# Patient Record
Sex: MALE | Race: BLACK OR AFRICAN AMERICAN | NOT HISPANIC OR LATINO | Employment: STUDENT | URBAN - METROPOLITAN AREA
[De-identification: names, ages, dates, MRNs, and addresses within clinical notes are randomized per-mention and may not be internally consistent; named-entity substitution may affect disease eponyms.]

---

## 2023-10-09 PROBLEM — R56.9 SEIZURES (MULTI): Status: ACTIVE | Noted: 2023-10-09

## 2023-10-09 PROBLEM — G40.309 GENERALIZED EPILEPSY (MULTI): Status: ACTIVE | Noted: 2023-10-09

## 2023-10-09 RX ORDER — ZONISAMIDE 100 MG/1
2 CAPSULE ORAL 2 TIMES DAILY
COMMUNITY
Start: 2014-07-18 | End: 2024-05-22 | Stop reason: SDUPTHER

## 2023-10-09 RX ORDER — DIVALPROEX SODIUM 250 MG/1
5 TABLET, FILM COATED, EXTENDED RELEASE ORAL ONCE
COMMUNITY
Start: 2014-09-04 | End: 2024-04-16 | Stop reason: ALTCHOICE

## 2024-01-15 ENCOUNTER — APPOINTMENT (OUTPATIENT)
Dept: NEUROLOGY | Facility: HOSPITAL | Age: 29
End: 2024-01-15
Payer: COMMERCIAL

## 2024-02-12 ENCOUNTER — APPOINTMENT (OUTPATIENT)
Dept: NEUROLOGY | Facility: HOSPITAL | Age: 29
End: 2024-02-12
Payer: COMMERCIAL

## 2024-04-16 ENCOUNTER — HOSPITAL ENCOUNTER (EMERGENCY)
Facility: HOSPITAL | Age: 29
Discharge: HOME | End: 2024-04-16
Attending: EMERGENCY MEDICINE
Payer: COMMERCIAL

## 2024-04-16 VITALS
OXYGEN SATURATION: 97 % | HEIGHT: 69 IN | SYSTOLIC BLOOD PRESSURE: 112 MMHG | TEMPERATURE: 97.7 F | WEIGHT: 150 LBS | HEART RATE: 87 BPM | RESPIRATION RATE: 12 BRPM | BODY MASS INDEX: 22.22 KG/M2 | DIASTOLIC BLOOD PRESSURE: 75 MMHG

## 2024-04-16 DIAGNOSIS — R56.9 SEIZURE (MULTI): Primary | ICD-10-CM

## 2024-04-16 LAB
ALBUMIN SERPL BCP-MCNC: 3.7 G/DL (ref 3.4–5)
ALP SERPL-CCNC: 39 U/L (ref 33–120)
ALT SERPL W P-5'-P-CCNC: 8 U/L (ref 10–52)
ANION GAP BLDV CALCULATED.4IONS-SCNC: 9 MMOL/L (ref 10–25)
ANION GAP SERPL CALC-SCNC: 14 MMOL/L (ref 10–20)
AST SERPL W P-5'-P-CCNC: 15 U/L (ref 9–39)
ATRIAL RATE: 59 BPM
BASE EXCESS BLDV CALC-SCNC: -1.6 MMOL/L (ref -2–3)
BASOPHILS # BLD AUTO: 0.03 X10*3/UL (ref 0–0.1)
BASOPHILS NFR BLD AUTO: 0.8 %
BILIRUB SERPL-MCNC: 0.3 MG/DL (ref 0–1.2)
BODY TEMPERATURE: 37 DEGREES CELSIUS
BUN SERPL-MCNC: 8 MG/DL (ref 6–23)
CA-I BLDV-SCNC: 1.26 MMOL/L (ref 1.1–1.33)
CALCIUM SERPL-MCNC: 8.6 MG/DL (ref 8.6–10.6)
CHLORIDE BLDV-SCNC: 108 MMOL/L (ref 98–107)
CHLORIDE SERPL-SCNC: 110 MMOL/L (ref 98–107)
CO2 SERPL-SCNC: 21 MMOL/L (ref 21–32)
CREAT SERPL-MCNC: 0.77 MG/DL (ref 0.5–1.3)
EGFRCR SERPLBLD CKD-EPI 2021: >90 ML/MIN/1.73M*2
EOSINOPHIL # BLD AUTO: 0.12 X10*3/UL (ref 0–0.7)
EOSINOPHIL NFR BLD AUTO: 3.4 %
ERYTHROCYTE [DISTWIDTH] IN BLOOD BY AUTOMATED COUNT: 12.8 % (ref 11.5–14.5)
GLUCOSE BLDV-MCNC: 92 MG/DL (ref 74–99)
GLUCOSE SERPL-MCNC: 87 MG/DL (ref 74–99)
HCO3 BLDV-SCNC: 23.7 MMOL/L (ref 22–26)
HCT VFR BLD AUTO: 39.3 % (ref 41–52)
HCT VFR BLD EST: 41 % (ref 41–52)
HGB BLD-MCNC: 13.2 G/DL (ref 13.5–17.5)
HGB BLDV-MCNC: 13.8 G/DL (ref 13.5–17.5)
IMM GRANULOCYTES # BLD AUTO: 0 X10*3/UL (ref 0–0.7)
IMM GRANULOCYTES NFR BLD AUTO: 0 % (ref 0–0.9)
INHALED O2 CONCENTRATION: 21 %
LACTATE BLDV-SCNC: 3.9 MMOL/L (ref 0.4–2)
LYMPHOCYTES # BLD AUTO: 0.92 X10*3/UL (ref 1.2–4.8)
LYMPHOCYTES NFR BLD AUTO: 26 %
MAGNESIUM SERPL-MCNC: 1.81 MG/DL (ref 1.6–2.4)
MCH RBC QN AUTO: 26.6 PG (ref 26–34)
MCHC RBC AUTO-ENTMCNC: 33.6 G/DL (ref 32–36)
MCV RBC AUTO: 79 FL (ref 80–100)
MONOCYTES # BLD AUTO: 0.18 X10*3/UL (ref 0.1–1)
MONOCYTES NFR BLD AUTO: 5.1 %
NEUTROPHILS # BLD AUTO: 2.29 X10*3/UL (ref 1.2–7.7)
NEUTROPHILS NFR BLD AUTO: 64.7 %
NRBC BLD-RTO: 0 /100 WBCS (ref 0–0)
OXYHGB MFR BLDV: 66.9 % (ref 45–75)
P AXIS: 54 DEGREES
P OFFSET: 193 MS
P ONSET: 136 MS
PCO2 BLDV: 41 MM HG (ref 41–51)
PH BLDV: 7.37 PH (ref 7.33–7.43)
PLATELET # BLD AUTO: 137 X10*3/UL (ref 150–450)
PO2 BLDV: 46 MM HG (ref 35–45)
POTASSIUM BLDV-SCNC: 3.8 MMOL/L (ref 3.5–5.3)
POTASSIUM SERPL-SCNC: 3.8 MMOL/L (ref 3.5–5.3)
PR INTERVAL: 162 MS
PROT SERPL-MCNC: 5.5 G/DL (ref 6.4–8.2)
Q ONSET: 217 MS
QRS COUNT: 10 BEATS
QRS DURATION: 100 MS
QT INTERVAL: 362 MS
QTC CALCULATION(BAZETT): 358 MS
QTC FREDERICIA: 360 MS
R AXIS: 77 DEGREES
RBC # BLD AUTO: 4.96 X10*6/UL (ref 4.5–5.9)
SAO2 % BLDV: 70 % (ref 45–75)
SODIUM BLDV-SCNC: 137 MMOL/L (ref 136–145)
SODIUM SERPL-SCNC: 141 MMOL/L (ref 136–145)
T AXIS: 34 DEGREES
T OFFSET: 398 MS
VENTRICULAR RATE: 59 BPM
WBC # BLD AUTO: 3.5 X10*3/UL (ref 4.4–11.3)

## 2024-04-16 PROCEDURE — 2500000005 HC RX 250 GENERAL PHARMACY W/O HCPCS: Mod: SE | Performed by: STUDENT IN AN ORGANIZED HEALTH CARE EDUCATION/TRAINING PROGRAM

## 2024-04-16 PROCEDURE — 84295 ASSAY OF SERUM SODIUM: CPT | Mod: 59 | Performed by: STUDENT IN AN ORGANIZED HEALTH CARE EDUCATION/TRAINING PROGRAM

## 2024-04-16 PROCEDURE — 36415 COLL VENOUS BLD VENIPUNCTURE: CPT | Performed by: STUDENT IN AN ORGANIZED HEALTH CARE EDUCATION/TRAINING PROGRAM

## 2024-04-16 PROCEDURE — 84132 ASSAY OF SERUM POTASSIUM: CPT | Performed by: STUDENT IN AN ORGANIZED HEALTH CARE EDUCATION/TRAINING PROGRAM

## 2024-04-16 PROCEDURE — 85018 HEMOGLOBIN: CPT | Mod: 59 | Performed by: STUDENT IN AN ORGANIZED HEALTH CARE EDUCATION/TRAINING PROGRAM

## 2024-04-16 PROCEDURE — 96365 THER/PROPH/DIAG IV INF INIT: CPT

## 2024-04-16 PROCEDURE — 99284 EMERGENCY DEPT VISIT MOD MDM: CPT | Mod: 25

## 2024-04-16 PROCEDURE — 85025 COMPLETE CBC W/AUTO DIFF WBC: CPT | Performed by: STUDENT IN AN ORGANIZED HEALTH CARE EDUCATION/TRAINING PROGRAM

## 2024-04-16 PROCEDURE — 83735 ASSAY OF MAGNESIUM: CPT | Performed by: STUDENT IN AN ORGANIZED HEALTH CARE EDUCATION/TRAINING PROGRAM

## 2024-04-16 PROCEDURE — 99285 EMERGENCY DEPT VISIT HI MDM: CPT | Performed by: EMERGENCY MEDICINE

## 2024-04-16 PROCEDURE — 2500000004 HC RX 250 GENERAL PHARMACY W/ HCPCS (ALT 636 FOR OP/ED): Mod: SE | Performed by: STUDENT IN AN ORGANIZED HEALTH CARE EDUCATION/TRAINING PROGRAM

## 2024-04-16 RX ORDER — DIVALPROEX SODIUM 500 MG/1
500 TABLET, FILM COATED, EXTENDED RELEASE ORAL DAILY
Qty: 30 TABLET | Refills: 0 | Status: SHIPPED | OUTPATIENT
Start: 2024-04-16 | End: 2024-05-22 | Stop reason: SDUPTHER

## 2024-04-16 RX ORDER — ACETAMINOPHEN 325 MG/1
975 TABLET ORAL ONCE
Status: COMPLETED | OUTPATIENT
Start: 2024-04-16 | End: 2024-04-16

## 2024-04-16 RX ORDER — ZONISAMIDE 100 MG/1
200 CAPSULE ORAL DAILY
Qty: 30 CAPSULE | Refills: 0 | Status: SHIPPED | OUTPATIENT
Start: 2024-04-16 | End: 2024-05-22 | Stop reason: ALTCHOICE

## 2024-04-16 RX ORDER — DIVALPROEX SODIUM 500 MG/1
1000 TABLET, FILM COATED, EXTENDED RELEASE ORAL DAILY
COMMUNITY
Start: 2024-01-06 | End: 2024-05-22 | Stop reason: SDUPTHER

## 2024-04-16 RX ADMIN — ACETAMINOPHEN 975 MG: 325 TABLET ORAL at 10:38

## 2024-04-16 RX ADMIN — DEXTROSE MONOHYDRATE 1500 MG: 50 INJECTION, SOLUTION INTRAVENOUS at 10:55

## 2024-04-16 ASSESSMENT — PAIN SCALES - GENERAL
PAINLEVEL_OUTOF10: 3
PAINLEVEL_OUTOF10: 0 - NO PAIN
PAINLEVEL_OUTOF10: 3

## 2024-04-16 ASSESSMENT — LIFESTYLE VARIABLES
EVER FELT BAD OR GUILTY ABOUT YOUR DRINKING: NO
TOTAL SCORE: 0
EVER HAD A DRINK FIRST THING IN THE MORNING TO STEADY YOUR NERVES TO GET RID OF A HANGOVER: NO
HAVE PEOPLE ANNOYED YOU BY CRITICIZING YOUR DRINKING: NO
HAVE YOU EVER FELT YOU SHOULD CUT DOWN ON YOUR DRINKING: NO

## 2024-04-16 ASSESSMENT — COLUMBIA-SUICIDE SEVERITY RATING SCALE - C-SSRS
6. HAVE YOU EVER DONE ANYTHING, STARTED TO DO ANYTHING, OR PREPARED TO DO ANYTHING TO END YOUR LIFE?: NO
1. IN THE PAST MONTH, HAVE YOU WISHED YOU WERE DEAD OR WISHED YOU COULD GO TO SLEEP AND NOT WAKE UP?: NO
2. HAVE YOU ACTUALLY HAD ANY THOUGHTS OF KILLING YOURSELF?: NO

## 2024-04-16 ASSESSMENT — PAIN - FUNCTIONAL ASSESSMENT: PAIN_FUNCTIONAL_ASSESSMENT: 0-10

## 2024-04-16 NOTE — ED TRIAGE NOTES
Pt to ED via EMS after witnessed seizure reportedly as passenger in car. Pt denies being in car and states he thinks he was at home when seizure occurred. He states he has a hx of seizure but has been out of medications for over 1 week. He is a&o x3 on arrival to ED. No loss of bowel or bladder. No oral trauma noted. He denies drugs or etoh use.

## 2024-04-16 NOTE — ED PROVIDER NOTES
"HPI   No chief complaint on file.      Patient is a 29-year-old male with past medical history of epilepsy noncompliant with home Depakote and zonisamide here after concern for seizure.  Patient states he was in a car, denies any prodrome, notes that  He knew he woke up, witness states that he had generalized tonic-clonic seizure.  He denies recent illness, focal weakness, numbness, paresthesias, states he has not been taking his medication because \"I havent been able to pick it up\".      History provided by:  Patient  History limited by: postictal.   used: No                        No data recorded                   Patient History   Past Medical History:   Diagnosis Date    Generalized idiopathic epilepsy and epileptic syndromes, not intractable, without status epilepticus (Multi)     Generalized epilepsy    Generalized idiopathic epilepsy and epileptic syndromes, not intractable, without status epilepticus (Multi)     Generalized tonic clonic epilepsy    Unspecified convulsions (Multi) 03/21/2022    Seizures     No past surgical history on file.  Family History   Problem Relation Name Age of Onset    Seizures Mother      Seizures Sister      Seizures Brother       Social History     Tobacco Use    Smoking status: Not on file    Smokeless tobacco: Not on file   Substance Use Topics    Alcohol use: Not on file    Drug use: Not on file       Physical Exam   ED Triage Vitals   Temp Pulse Resp BP   -- -- -- --      SpO2 Temp src Heart Rate Source Patient Position   -- -- -- --      BP Location FiO2 (%)     -- --       Physical Exam  Constitutional:       Appearance: Normal appearance.   HENT:      Head: Normocephalic and atraumatic.      Right Ear: External ear normal.      Left Ear: External ear normal.      Nose: Nose normal.      Mouth/Throat:      Mouth: Mucous membranes are moist.      Pharynx: Oropharynx is clear.   Eyes:      Extraocular Movements: Extraocular movements intact.      " Conjunctiva/sclera: Conjunctivae normal.      Pupils: Pupils are equal, round, and reactive to light.   Cardiovascular:      Rate and Rhythm: Normal rate and regular rhythm.      Pulses: Normal pulses.      Heart sounds: Normal heart sounds.   Pulmonary:      Effort: Pulmonary effort is normal. No respiratory distress.   Chest:      Chest wall: No tenderness.   Abdominal:      Palpations: Abdomen is soft.      Tenderness: There is no abdominal tenderness.   Musculoskeletal:         General: Normal range of motion.      Cervical back: Normal range of motion and neck supple. No rigidity or tenderness.      Right lower leg: No edema.      Left lower leg: No edema.   Skin:     General: Skin is warm and dry.      Capillary Refill: Capillary refill takes less than 2 seconds.   Neurological:      General: No focal deficit present.      Mental Status: He is alert and oriented to person, place, and time. Mental status is at baseline.      Sensory: No sensory deficit.      Motor: No weakness.   Psychiatric:         Mood and Affect: Mood normal.         Behavior: Behavior normal.       ED Course & MDM        Medical Decision Making  29-year-old male here after generalized tonic-clonic seizure consistent with previous seizure semiology.  Notes noncompliance with his home meds, no signs of trauma on exam and he has a nonfocal neurologic exam.  In light of this, I do not believe he needs CT imaging at this time.  Did Depakote load him, ensure he has his prescriptions for zonisamide, and Depakote, no obvious medical causes of seizure including hyponatremia, discharged with referral to neurology and discharged.    Amount and/or Complexity of Data Reviewed  External Data Reviewed: notes.  Labs: ordered.    Risk  Diagnosis or treatment significantly limited by social determinants of health.        Procedure  Procedures     Florencio Katz MD  Resident  04/16/24 0123

## 2024-05-22 ENCOUNTER — OFFICE VISIT (OUTPATIENT)
Dept: NEUROLOGY | Facility: CLINIC | Age: 29
End: 2024-05-22
Payer: COMMERCIAL

## 2024-05-22 VITALS
DIASTOLIC BLOOD PRESSURE: 58 MMHG | WEIGHT: 147 LBS | HEART RATE: 76 BPM | SYSTOLIC BLOOD PRESSURE: 107 MMHG | RESPIRATION RATE: 18 BRPM | BODY MASS INDEX: 21.71 KG/M2

## 2024-05-22 DIAGNOSIS — G40.309 EPILEPSY SEIZURE, GENERALIZED, CONVULSIVE (MULTI): Primary | ICD-10-CM

## 2024-05-22 DIAGNOSIS — R56.9 SEIZURE (MULTI): ICD-10-CM

## 2024-05-22 PROCEDURE — 99214 OFFICE O/P EST MOD 30 MIN: CPT | Performed by: NURSE PRACTITIONER

## 2024-05-22 RX ORDER — ZONISAMIDE 100 MG/1
400 CAPSULE ORAL NIGHTLY
Qty: 120 CAPSULE | Refills: 11 | Status: SHIPPED | OUTPATIENT
Start: 2024-05-22 | End: 2025-05-22

## 2024-05-22 RX ORDER — DIVALPROEX SODIUM 500 MG/1
1000 TABLET, FILM COATED, EXTENDED RELEASE ORAL DAILY
Qty: 60 TABLET | Refills: 11 | Status: SHIPPED | OUTPATIENT
Start: 2024-05-22 | End: 2025-05-22

## 2024-05-22 RX ORDER — DIVALPROEX SODIUM 500 MG/1
500 TABLET, FILM COATED, EXTENDED RELEASE ORAL DAILY
Qty: 30 TABLET | Refills: 11 | Status: SHIPPED | OUTPATIENT
Start: 2024-05-22 | End: 2025-05-22

## 2024-05-22 ASSESSMENT — PAIN SCALES - GENERAL: PAINLEVEL: 0-NO PAIN

## 2024-05-22 NOTE — PROGRESS NOTES
[facility]   Epilepsy    Patient ID: Ela Cotton 29 y.o.male presenting in follow-up for previously diagnosed epilepsy  Patient of: Dr. Tosin Barrios    [facility] [place]    Classification  EPILEPTIC Paroxysmal Events  EZ: generalized  Semiology: Myoclonic -> GTC  -         Lateralizing signs  - Diagnostic signs Tongue bitting  -         Frequency: last GTC in sept 2021. occasional myoclonic  History of Status Epilepticus: no  Etiology: genetic   Comorbidities: Right frontal gliosis       RESULTS:  MRI: No MRI head results found for the past 12 months     EEG:  No EEG results found for the past 12 months    Admission on 04/16/2024, Discharged on 04/16/2024   Component Date Value    Ventricular Rate 04/16/2024 59     Atrial Rate 04/16/2024 59     RI Interval 04/16/2024 162     QRS Duration 04/16/2024 100     QT Interval 04/16/2024 362     QTC Calculation(Bazett) 04/16/2024 358     P Axis 04/16/2024 54     R Axis 04/16/2024 77     T [place] 04/16/2024 34     QRS Count 04/16/2024 10     Q Onset 04/16/2024 217     P Onset 04/16/2024 136     P Offset 04/16/2024 193     T Offset 04/16/2024 398     QTC Fred[place] 04/16/2024 360     WBC 04/16/2024 3.5 (L)     nRBC 04/16/2024 0.0     RBC 04/16/2024 4.96     Hemoglobin 04/16/2024 13.2 (L)     Hematocrit 04/16/2024 39.3 (L)     MCV 04/16/2024 79 (L)     MCH 04/16/2024 26.6     MCHC 04/16/2024 33.6     RDW 04/16/2024 12.8     Platelets 04/16/2024 137 (L)     Neutrophils % 04/16/2024 64.7     Immature Granulocytes %,* 04/16/2024 0.0     Lymphocytes % 04/16/2024 26.0     Monocytes % 04/16/2024 5.1     Eosinophils % 04/16/2024 3.4     Basophils % 04/16/2024 0.8     Neutrophils Absolute 04/16/2024 2.29     Immature Granulocytes Ab* 04/16/2024 0.00     Lymphocytes Absolute 04/16/2024 0.92 (L)     Monocytes Absolute 04/16/2024 0.18     Eosinophils Absolute 04/16/2024 0.12     Basophils Absolute 04/16/2024 0.03     Magnesium 04/16/2024 1.81     Glucose 04/16/2024 87     Sodium  04/16/2024 141     Potassium 04/16/2024 3.8     Chloride 04/16/2024 110 (H)     Bicarbonate 04/16/2024 21     Anion Gap 04/16/2024 14     Urea Nitrogen 04/16/2024 8     Creatinine 04/16/2024 0.77     eGFR 04/16/2024 >90     Calcium 04/16/2024 8.6     Albumin 04/16/2024 3.7     Alkaline Phosphatase 04/16/2024 39     Total Protein 04/16/2024 5.5 (L)     AST 04/16/2024 15     Bilirubin, Total 04/16/2024 0.3     ALT 04/16/2024 8 (L)     POCT pH, Venous 04/16/2024 7.37     POCT pCO2, Venous 04/16/2024 41     POCT pO2, Venous 04/16/2024 46 (H)     POCT SO2, Venous 04/16/2024 70     POCT Oxy Hemoglobin, Forrest* 04/16/2024 66.9     POCT Hematocrit Calculat* 04/16/2024 41.0     POCT Sodium, Venous 04/16/2024 137     POCT Potassium, Venous 04/16/2024 3.8     POCT Chloride, Venous 04/16/2024 108 (H)     POCT Ionized Calicum, Ve* 04/16/2024 1.26     POCT Glucose, Venous 04/16/2024 92     POCT Lactate, Venous 04/16/2024 3.9 (H)     POCT Base Excess, Venous 04/16/2024 -1.6     POCT HCO3 Calculated, Ve* 04/16/2024 23.7     POCT Hemoglobin, Venous 04/16/2024 13.8     POCT Anion Gap, Venous 04/16/2024 9.0 (L)     Patient Temperature 04/16/2024 37.0     FiO2 04/16/2024 21              PRESENT CONCERNS:    Ela presents alone today. He was in the ED in April for breakthrough seizure. He had not been taking his medications as he hd run out. He denies side effects from the meds.     Review of Systems  All other systems reviewed and negative unless otherwise stated above    CONTROLLED SUBSTANCE  N/A      Vitals:  Vitals:    05/22/24 1514   BP: 107/58   Pulse: 76   Resp: 18       PHYSICAL EXAM:  Neurologic Exam   The patient was alert and oriented to person, time and place. Language, concentration and memory were intact. Affect was normal.   Eye movements were intact. Muscles of mastication and facial expression moved normally. Hearing was normal bilaterally. There was no dysarthria.  Coordination in the arms and legs was  intact  Involuntary movements: none.  Standard gait was normal      ASSESSMENT & PLAN:   29 y.o. male presenting in follow-up for previously diagnosed epilepsy    Problem List Items Addressed This Visit    None  Visit Diagnoses       Epilepsy seizure, generalized, convulsive (Multi)    -  Primary          GTC due to medication non compliance. He will try taking all meds at night to help with compliance. Will try this regimen for 2-3 months and follow up.     Continue ZNS can switch to taking 400mg nightly for compliance and continue depakote 1500mg daily  RTC 3 months   please follow seizure precautions which include no driving until 6 months seizure free and cleared by a provider (October)  Call me with any seizures prior to your next appointment   Given my contact information/instructions for Omaira

## 2024-05-22 NOTE — PATIENT INSTRUCTIONS
"Thank you for coming to the Epilepsy Clinic today.  -If you have any sudden new, concerning or worsening symptoms, call 911 and go to the Emergency Room. Otherwise, it was good seeing you today-    -Please follow seizure precautions:   Please do not drive, operate any heavy machinery, swim unsupervised, please shower without collection of water instead of bathe. Be cautious around hot, heavy, or sharp objects. Do not cook with an open flame and do not perform any activities at heights such as on a ladder. These precautions should stay in place until 6 months seizure free and cleared by a provider.    -HOW TO CONTACT NEGRITO ZHOU EPILEPSY NURSE PRACTITIONER ([phone]).   Instructed to call in the event of seizure, medication refills, or any questions  *Please allow 24-48 hours for non-urgent responses*.  For emergency concerns, please dial 911 or present to the nearest emergency room.  For concerns after business hours (8am-4:30pm) or on weekends please call [phone]  To call and schedule a follow up appointment please call [phone]  -Paperwork may take up to 3 business days to complete-    Every attempt is made to run on time for your appointment, if you are 15 minutes or later for your appoinement you may be asked to reschedule    -Compliance education: It is important to continue to try and achieve seizure control because of the potential for injury and illness due to seizures. In a very small minority of patients with generalized tonic clonic seizures (\"grand mal\"), breathing or heart function can stop during a seizure and result in demise (sudden unexpected death in epilepsy or SUDEP). [place] from seizures prevents this kind of outcome-     Continue taking (Zonegran) 400mg every night. In addition to being a good headache medication which we can increase as needed, it is also a good seizure medication. Make sure to drink plenty of water, as higher doses of this medication can cause kidney stones, " as well as other side effects such as drowsiness, hallucination and imbalance. It can also cause a decrease in appetite, so keep an eye on any changes in your weight.     Please continue to take your valproic acid (Divalproex sodium) 1500 mg daily Overall, this is a very effective and well-tolerated medication for seizures. Some patients may experience weight gain or tremor at higher doses. Also, we will continue to monitor bloodwork, as there can be mild effects on your blood and liver health. Let us know if you are having any difficulties or side effects.

## 2024-09-26 ENCOUNTER — HOSPITAL ENCOUNTER (EMERGENCY)
Facility: HOSPITAL | Age: 29
Discharge: HOME | End: 2024-09-26
Payer: COMMERCIAL

## 2024-09-26 PROCEDURE — 4500999001 HC ED NO CHARGE

## 2025-06-26 ENCOUNTER — OFFICE VISIT (OUTPATIENT)
Dept: NEUROLOGY | Facility: HOSPITAL | Age: 30
End: 2025-06-26
Payer: COMMERCIAL

## 2025-06-26 VITALS
SYSTOLIC BLOOD PRESSURE: 119 MMHG | HEIGHT: 69 IN | WEIGHT: 157 LBS | DIASTOLIC BLOOD PRESSURE: 66 MMHG | HEART RATE: 56 BPM | BODY MASS INDEX: 23.25 KG/M2

## 2025-06-26 DIAGNOSIS — G40.309 EPILEPSY SEIZURE, GENERALIZED, CONVULSIVE (MULTI): ICD-10-CM

## 2025-06-26 PROCEDURE — 99214 OFFICE O/P EST MOD 30 MIN: CPT | Performed by: NURSE PRACTITIONER

## 2025-06-26 PROCEDURE — 3008F BODY MASS INDEX DOCD: CPT | Performed by: NURSE PRACTITIONER

## 2025-06-26 RX ORDER — ZONISAMIDE 100 MG/1
400 CAPSULE ORAL NIGHTLY
Qty: 120 CAPSULE | Refills: 11 | Status: SHIPPED | OUTPATIENT
Start: 2025-06-26 | End: 2026-06-26

## 2025-06-26 RX ORDER — DIVALPROEX SODIUM 500 MG/1
1500 TABLET, FILM COATED, EXTENDED RELEASE ORAL DAILY
Qty: 90 TABLET | Refills: 11 | Status: SHIPPED | OUTPATIENT
Start: 2025-06-26 | End: 2026-06-26

## 2025-06-26 NOTE — PROGRESS NOTES
[facility]   Epilepsy    Patient ID: Ela Cotton 30 y.o.male presenting in follow-up for previously diagnosed epilepsy  Patient of: Dr. Tosin Barrios    [facility]    Classification  EPILEPTIC Paroxysmal Events  EZ: generalized  Semiology: Myoclonic -> GTC  -         Lateralizing signs  - Diagnostic signs Tongue bitting  -         Frequency: last GTC in sept 2021. occasional myoclonic  History of Status Epilepticus: no  Etiology: genetic   Comorbidities: Right frontal gliosis       RESULTS:  MRI: No MRI head results found for the past 12 months     EEG:  No EEG results found for the past 12 months       PRESENT CONCERNS:    Ela presents alone today. He was in the ED in May  for breakthrough seizure. He had not been taking his medications as he hd run out. He denies side effects from the meds. He denies side effects from the medication. Would like to know if he can have dental implants done     Review of Systems  All other systems reviewed and negative unless otherwise stated above    CONTROLLED SUBSTANCE  N/A      Vitals:  Vitals:    06/26/25 1045   BP: 119/66   Pulse: 56         PHYSICAL EXAM:  Neurologic Exam   The patient was alert and oriented to person, time and place. Language, concentration and memory were intact. Affect was normal.   Eye movements were intact. Muscles of mastication and facial expression moved normally. Hearing was normal bilaterally. There was no dysarthria.  Coordination in the arms and legs was intact  Involuntary movements: none.  Standard gait was normal      ASSESSMENT & PLAN:   30 y.o. male presenting in follow-up for previously diagnosed epilepsy    Problem List Items Addressed This Visit    None  Visit Diagnoses         Epilepsy seizure, generalized, convulsive (Multi)        Relevant Medications    zonisamide (Zonegran) 100 mg capsule    divalproex (Depakote ER) 500 mg 24 hr tablet            GTC due to medication non compliance. Again advised he could take all  medications together at night to help with compliance. 12 months of refills sent. Will try this regimen for 2-3 months and follow up. Ok for dental procedure     Continue ZNS can switch to taking 400mg nightly for compliance and continue depakote 1500mg daily  RTC 3 months   please follow seizure precautions which include no driving until 6 months seizure free and cleared by a provider   Call me with any seizures prior to your next appointment   Given my contact information/instructions for Omaira

## 2025-06-26 NOTE — PATIENT INSTRUCTIONS
"Thank you for coming to the Epilepsy Clinic today.  -If you have any sudden new, concerning or worsening symptoms, call 911 and go to the Emergency Room. Otherwise, it was good seeing you today-    -Please follow seizure precautions:   Please do not drive, operate any heavy machinery, swim unsupervised, please shower without collection of water instead of bathe. Be cautious around hot, heavy, or sharp objects. Do not cook with an open flame and do not perform any activities at heights such as on a ladder. These precautions should stay in place until 6 months seizure free and cleared by a provider.    -HOW TO CONTACT NEGRITO ZHOU EPILEPSY NURSE PRACTITIONER ([phone]).   Instructed to call in the event of seizure, medication refills, or any questions  *Please allow 24-48 hours for non-urgent responses*.  For emergency concerns, please dial 911 or present to the nearest emergency room.  For concerns after business hours (8am-4:30pm) or on weekends please call [phone]  To call and schedule a follow up appointment please call [phone]  -Paperwork may take up to 3 business days to complete-    Every attempt is made to run on time for your appointment, if you are 15 minutes or later for your appoinement you may be asked to reschedule    -Compliance education: It is important to continue to try and achieve seizure control because of the potential for injury and illness due to seizures. In a very small minority of patients with generalized tonic clonic seizures (\"grand mal\"), breathing or heart function can stop during a seizure and result in demise (sudden unexpected death in epilepsy or SUDEP). [place] from seizures prevents this kind of outcome-   "